# Patient Record
Sex: FEMALE | Race: BLACK OR AFRICAN AMERICAN | NOT HISPANIC OR LATINO | URBAN - METROPOLITAN AREA
[De-identification: names, ages, dates, MRNs, and addresses within clinical notes are randomized per-mention and may not be internally consistent; named-entity substitution may affect disease eponyms.]

---

## 2017-01-30 ENCOUNTER — OUTPATIENT (OUTPATIENT)
Dept: OUTPATIENT SERVICES | Facility: HOSPITAL | Age: 42
LOS: 1 days | End: 2017-01-30
Payer: COMMERCIAL

## 2017-01-30 VITALS
TEMPERATURE: 98 F | DIASTOLIC BLOOD PRESSURE: 102 MMHG | HEART RATE: 81 BPM | RESPIRATION RATE: 18 BRPM | SYSTOLIC BLOOD PRESSURE: 140 MMHG | WEIGHT: 235.89 LBS | HEIGHT: 65 IN

## 2017-01-30 DIAGNOSIS — R03.0 ELEVATED BLOOD-PRESSURE READING, WITHOUT DIAGNOSIS OF HYPERTENSION: ICD-10-CM

## 2017-01-30 DIAGNOSIS — Z91.013 ALLERGY TO SEAFOOD: ICD-10-CM

## 2017-01-30 DIAGNOSIS — Z90.721 ACQUIRED ABSENCE OF OVARIES, UNILATERAL: Chronic | ICD-10-CM

## 2017-01-30 DIAGNOSIS — S82.899A OTHER FRACTURE OF UNSPECIFIED LOWER LEG, INITIAL ENCOUNTER FOR CLOSED FRACTURE: Chronic | ICD-10-CM

## 2017-01-30 DIAGNOSIS — D25.9 LEIOMYOMA OF UTERUS, UNSPECIFIED: ICD-10-CM

## 2017-01-30 DIAGNOSIS — E66.9 OBESITY, UNSPECIFIED: ICD-10-CM

## 2017-01-30 LAB
BLD GP AB SCN SERPL QL: NEGATIVE — SIGNIFICANT CHANGE UP
BUN SERPL-MCNC: 12 MG/DL — SIGNIFICANT CHANGE UP (ref 7–23)
CALCIUM SERPL-MCNC: 9.2 MG/DL — SIGNIFICANT CHANGE UP (ref 8.4–10.5)
CHLORIDE SERPL-SCNC: 102 MMOL/L — SIGNIFICANT CHANGE UP (ref 98–107)
CO2 SERPL-SCNC: 27 MMOL/L — SIGNIFICANT CHANGE UP (ref 22–31)
CREAT SERPL-MCNC: 0.73 MG/DL — SIGNIFICANT CHANGE UP (ref 0.5–1.3)
GLUCOSE SERPL-MCNC: 113 MG/DL — HIGH (ref 70–99)
HCG SERPL-ACNC: < 5 MIU/ML — SIGNIFICANT CHANGE UP
HCT VFR BLD CALC: 39.8 % — SIGNIFICANT CHANGE UP (ref 34.5–45)
HGB BLD-MCNC: 12.4 G/DL — SIGNIFICANT CHANGE UP (ref 11.5–15.5)
MCHC RBC-ENTMCNC: 26.9 PG — LOW (ref 27–34)
MCHC RBC-ENTMCNC: 31.2 % — LOW (ref 32–36)
MCV RBC AUTO: 86.3 FL — SIGNIFICANT CHANGE UP (ref 80–100)
PLATELET # BLD AUTO: 259 K/UL — SIGNIFICANT CHANGE UP (ref 150–400)
PMV BLD: 11.3 FL — SIGNIFICANT CHANGE UP (ref 7–13)
POTASSIUM SERPL-MCNC: 4 MMOL/L — SIGNIFICANT CHANGE UP (ref 3.5–5.3)
POTASSIUM SERPL-SCNC: 4 MMOL/L — SIGNIFICANT CHANGE UP (ref 3.5–5.3)
RBC # BLD: 4.61 M/UL — SIGNIFICANT CHANGE UP (ref 3.8–5.2)
RBC # FLD: 20.5 % — HIGH (ref 10.3–14.5)
RH IG SCN BLD-IMP: POSITIVE — SIGNIFICANT CHANGE UP
SODIUM SERPL-SCNC: 142 MMOL/L — SIGNIFICANT CHANGE UP (ref 135–145)
WBC # BLD: 6.02 K/UL — SIGNIFICANT CHANGE UP (ref 3.8–10.5)
WBC # FLD AUTO: 6.02 K/UL — SIGNIFICANT CHANGE UP (ref 3.8–10.5)

## 2017-01-30 PROCEDURE — 93010 ELECTROCARDIOGRAM REPORT: CPT

## 2017-01-30 RX ORDER — SODIUM CHLORIDE 9 MG/ML
3 INJECTION INTRAMUSCULAR; INTRAVENOUS; SUBCUTANEOUS EVERY 8 HOURS
Qty: 0 | Refills: 0 | Status: DISCONTINUED | OUTPATIENT
Start: 2017-03-10 | End: 2017-03-23

## 2017-01-30 RX ORDER — SODIUM CHLORIDE 9 MG/ML
1000 INJECTION, SOLUTION INTRAVENOUS
Qty: 0 | Refills: 0 | Status: DISCONTINUED | OUTPATIENT
Start: 2017-03-10 | End: 2017-03-11

## 2017-01-30 NOTE — H&P PST ADULT - GASTROINTESTINAL DETAILS
soft/bowel sounds normal/nontender/no distention bowel sounds normal/soft/no guarding/nontender/no distention/no rigidity/no bruit/no rebound tenderness

## 2017-01-30 NOTE — H&P PST ADULT - NEGATIVE CARDIOVASCULAR SYMPTOMS
no paroxysmal nocturnal dyspnea/no palpitations/no dyspnea on exertion/no orthopnea/no chest pain/no peripheral edema no peripheral edema/no chest pain/no dyspnea on exertion/no paroxysmal nocturnal dyspnea/no palpitations/no claudication/no orthopnea

## 2017-01-30 NOTE — H&P PST ADULT - RS GEN PE MLT RESP DETAILS PC
airway patent/no subcutaneous emphysema/no rales/good air movement/respirations non-labored/no intercostal retractions/no chest wall tenderness/breath sounds equal/no wheezes/no rhonchi/clear to auscultation bilaterally

## 2017-01-30 NOTE — H&P PST ADULT - HISTORY OF PRESENT ILLNESS
43 y/o Black female presents to PST for pre op evaluation with hx of menorrhagia with clots x 3 . Pt stated that has been progressively worse for the past year. Pt was seen by GYN, had transvaginal and abd. US and MRI (2016). Pre op diagnosis leiomyoma of uterus. Now scheduled for abdominal myomectomy on 02/08/17 41 y/o Black female presents to PST for pre op evaluation with hx of menorrhagia with clots x 3 years. Pt stated that symptoms have been progressively worsening for the past year. Pt was seen by GYN, had transvaginal and abd. US and MRI (2016). Pre op diagnosis leiomyoma of uterus. Now scheduled for abdominal myomectomy on 02/08/17

## 2017-01-30 NOTE — H&P PST ADULT - NEGATIVE OPHTHALMOLOGIC SYMPTOMS
no loss of vision L/no scleral injection R/no pain R/no lacrimation L/no diplopia/no discharge R/no loss of vision R/no scleral injection L/no lacrimation R/no photophobia/no pain L/no irritation R/no discharge L/no irritation L/no blurred vision R/no blurred vision L

## 2017-01-30 NOTE — H&P PST ADULT - NEGATIVE GASTROINTESTINAL SYMPTOMS
no nausea/no vomiting/no diarrhea/no jaundice/no abdominal pain/no hiccoughs/no change in bowel habits/no melena

## 2017-01-30 NOTE — H&P PST ADULT - FAMILY HISTORY
Father  Still living? Yes, Estimated age: 61-70  Hypertension, Age at diagnosis: Age Unknown     Mother  Still living? Unknown  Hypertension, Age at diagnosis: Age Unknown

## 2017-01-30 NOTE — H&P PST ADULT - NEGATIVE GENERAL SYMPTOMS
no sweating/no weight loss/no fatigue/no weight gain/no malaise/no chills/no anorexia/no fever no chills/no weight loss/no polydipsia/no malaise/no fatigue/no anorexia/no fever/no polyuria/no weight gain/no sweating

## 2017-01-30 NOTE — H&P PST ADULT - PROBLEM SELECTOR PLAN 2
/100 repeated 140/102 during PST visit. pt denies any headache, change in vision , change in LOC or signs of cardiac decompensations. Pt instructed to see PCP for further BP evaluation prior to surgery. Pt left unit walking in no apparent distress.  Pending medical clearance

## 2017-03-08 ENCOUNTER — OUTPATIENT (OUTPATIENT)
Dept: OUTPATIENT SERVICES | Facility: HOSPITAL | Age: 42
LOS: 1 days | End: 2017-03-08
Payer: COMMERCIAL

## 2017-03-08 VITALS
SYSTOLIC BLOOD PRESSURE: 130 MMHG | RESPIRATION RATE: 16 BRPM | TEMPERATURE: 99 F | HEIGHT: 65.5 IN | HEART RATE: 84 BPM | DIASTOLIC BLOOD PRESSURE: 82 MMHG | WEIGHT: 229.94 LBS

## 2017-03-08 DIAGNOSIS — Z90.721 ACQUIRED ABSENCE OF OVARIES, UNILATERAL: Chronic | ICD-10-CM

## 2017-03-08 DIAGNOSIS — S82.899A OTHER FRACTURE OF UNSPECIFIED LOWER LEG, INITIAL ENCOUNTER FOR CLOSED FRACTURE: Chronic | ICD-10-CM

## 2017-03-08 DIAGNOSIS — I10 ESSENTIAL (PRIMARY) HYPERTENSION: ICD-10-CM

## 2017-03-08 DIAGNOSIS — D25.9 LEIOMYOMA OF UTERUS, UNSPECIFIED: ICD-10-CM

## 2017-03-08 LAB
BLD GP AB SCN SERPL QL: NEGATIVE — SIGNIFICANT CHANGE UP
BUN SERPL-MCNC: 14 MG/DL — SIGNIFICANT CHANGE UP (ref 7–23)
CALCIUM SERPL-MCNC: 9.5 MG/DL — SIGNIFICANT CHANGE UP (ref 8.4–10.5)
CHLORIDE SERPL-SCNC: 102 MMOL/L — SIGNIFICANT CHANGE UP (ref 98–107)
CO2 SERPL-SCNC: 25 MMOL/L — SIGNIFICANT CHANGE UP (ref 22–31)
CREAT SERPL-MCNC: 0.68 MG/DL — SIGNIFICANT CHANGE UP (ref 0.5–1.3)
GLUCOSE SERPL-MCNC: 102 MG/DL — HIGH (ref 70–99)
HCG SERPL-ACNC: < 5 MIU/ML — SIGNIFICANT CHANGE UP
HCT VFR BLD CALC: 39.2 % — SIGNIFICANT CHANGE UP (ref 34.5–45)
HGB BLD-MCNC: 12.7 G/DL — SIGNIFICANT CHANGE UP (ref 11.5–15.5)
MCHC RBC-ENTMCNC: 28.7 PG — SIGNIFICANT CHANGE UP (ref 27–34)
MCHC RBC-ENTMCNC: 32.4 % — SIGNIFICANT CHANGE UP (ref 32–36)
MCV RBC AUTO: 88.5 FL — SIGNIFICANT CHANGE UP (ref 80–100)
PLATELET # BLD AUTO: 280 K/UL — SIGNIFICANT CHANGE UP (ref 150–400)
PMV BLD: 11.2 FL — SIGNIFICANT CHANGE UP (ref 7–13)
POTASSIUM SERPL-MCNC: 3.8 MMOL/L — SIGNIFICANT CHANGE UP (ref 3.5–5.3)
POTASSIUM SERPL-SCNC: 3.8 MMOL/L — SIGNIFICANT CHANGE UP (ref 3.5–5.3)
RBC # BLD: 4.43 M/UL — SIGNIFICANT CHANGE UP (ref 3.8–5.2)
RBC # FLD: 15.1 % — HIGH (ref 10.3–14.5)
RH IG SCN BLD-IMP: POSITIVE — SIGNIFICANT CHANGE UP
SODIUM SERPL-SCNC: 142 MMOL/L — SIGNIFICANT CHANGE UP (ref 135–145)
WBC # BLD: 6.24 K/UL — SIGNIFICANT CHANGE UP (ref 3.8–10.5)
WBC # FLD AUTO: 6.24 K/UL — SIGNIFICANT CHANGE UP (ref 3.8–10.5)

## 2017-03-08 PROCEDURE — 93010 ELECTROCARDIOGRAM REPORT: CPT

## 2017-03-08 RX ORDER — SODIUM CHLORIDE 9 MG/ML
1000 INJECTION, SOLUTION INTRAVENOUS
Qty: 0 | Refills: 0 | Status: DISCONTINUED | OUTPATIENT
Start: 2017-03-10 | End: 2017-03-11

## 2017-03-08 RX ORDER — CHOLECALCIFEROL (VITAMIN D3) 125 MCG
1 CAPSULE ORAL
Qty: 0 | Refills: 0 | COMMUNITY

## 2017-03-08 NOTE — H&P PST ADULT - NEGATIVE GENERAL GENITOURINARY SYMPTOMS
no urinary hesitancy/no nocturia/no bladder infections/normal urinary frequency/no gas in urine/no dysuria/no flank pain L/normal libido/no flank pain R/no hematuria/no renal colic

## 2017-03-08 NOTE — H&P PST ADULT - PROBLEM SELECTOR PLAN 2
/100 repeated 140/102 during PST visit. pt denies any headache, change in vision , change in LOC or signs of cardiac decompensations. Pt instructed to see PCP for further BP evaluation prior to surgery. Pt left unit walking in no apparent distress.  Pending medical clearance BRIAN precautions recommended, OR booking notified via fax

## 2017-03-08 NOTE — H&P PST ADULT - NEGATIVE GASTROINTESTINAL SYMPTOMS
no abdominal pain/no vomiting/no nausea/no hiccoughs/no change in bowel habits/no diarrhea/no melena/no jaundice

## 2017-03-08 NOTE — H&P PST ADULT - NEGATIVE GENERAL SYMPTOMS
no chills/no malaise/no fatigue/no fever/no polydipsia/no weight loss/no weight gain/no polyuria/no anorexia/no sweating

## 2017-03-08 NOTE — H&P PST ADULT - PROBLEM SELECTOR PLAN 4
OR booking notified via fax pt instructed to take amlodipine on morning of surgery  pending copy of medical eval

## 2017-03-08 NOTE — H&P PST ADULT - HISTORY OF PRESENT ILLNESS
41 y/o Black female presents to PST for pre op evaluation with hx of menorrhagia with clots x 3 years. Pt stated that symptoms have been progressively worsening for the past year. Pt was seen by GYN, had transvaginal and abd. US and MRI (2016). Pre op diagnosis leiomyoma of uterus. Now scheduled for abdominal myomectomy on 02/08/17 41 y/o Black female presents to PST for pre op evaluation with hx of menorrhagia with clots x 3 years. Pt stated that symptoms have been progressively worsening for the past year. Pt was seen by GYN, had transvaginal and abd. US and MRI (2016). Pre op diagnosis leiomyoma of uterus. Was scheduled for abdominal myomectomy on 02/08/17.  Procedure was cancelled due to elevated b/p and now rescheduled for 3/10/2017.

## 2017-03-08 NOTE — H&P PST ADULT - NEGATIVE CARDIOVASCULAR SYMPTOMS
no claudication/no palpitations/no chest pain/no dyspnea on exertion/no orthopnea/no paroxysmal nocturnal dyspnea/no peripheral edema

## 2017-03-08 NOTE — H&P PST ADULT - PMH
Anemia    Leiomyoma of uterus, unspecified    Obesity (BMI 30-39.9) Anemia    Asthma  h/o childhood  Hypertension    Leiomyoma of uterus, unspecified    Obesity (BMI 30-39.9)

## 2017-03-08 NOTE — H&P PST ADULT - NEGATIVE OPHTHALMOLOGIC SYMPTOMS
no pain L/no photophobia/no loss of vision L/no irritation R/no pain R/no diplopia/no discharge L/no lacrimation L/no irritation L/no lacrimation R/no loss of vision R/no scleral injection R/no scleral injection L/no blurred vision R/no discharge R/no blurred vision L

## 2017-03-08 NOTE — H&P PST ADULT - PROBLEM SELECTOR PLAN 1
Scheduled for abdominal myomectomy on 02/08/17. Pre op instructions, famotidine chlorhexidine gluconate soap given and explained. Pt verbalized understanding. Pt instructed to bring urine specimen on day of surgery, container given to pt who verbalized understanding. Scheduled for abdominal myomectomy on 02/08/17. Pre op instructions, famotidine chlorhexidine gluconate soap given and explained.   Pt verbalized understanding of all PST instruction

## 2017-03-08 NOTE — H&P PST ADULT - NEGATIVE BREAST SYMPTOMS
no breast lump R/no nipple discharge L/no breast tenderness R/no breast lump L/no nipple discharge R

## 2017-03-09 NOTE — ASU PATIENT PROFILE, ADULT - PMH
Anemia    Asthma  h/o childhood  Hypertension    Leiomyoma of uterus, unspecified    Obesity (BMI 30-39.9)

## 2017-03-10 ENCOUNTER — INPATIENT (INPATIENT)
Facility: HOSPITAL | Age: 42
LOS: 2 days | Discharge: HOME | End: 2017-03-13
Attending: OBSTETRICS & GYNECOLOGY | Admitting: OBSTETRICS & GYNECOLOGY
Payer: COMMERCIAL

## 2017-03-10 VITALS
RESPIRATION RATE: 19 BRPM | TEMPERATURE: 99 F | SYSTOLIC BLOOD PRESSURE: 151 MMHG | HEART RATE: 80 BPM | DIASTOLIC BLOOD PRESSURE: 99 MMHG | OXYGEN SATURATION: 96 %

## 2017-03-10 VITALS
RESPIRATION RATE: 14 BRPM | TEMPERATURE: 98 F | WEIGHT: 229.94 LBS | SYSTOLIC BLOOD PRESSURE: 133 MMHG | DIASTOLIC BLOOD PRESSURE: 86 MMHG | OXYGEN SATURATION: 99 % | HEART RATE: 85 BPM | HEIGHT: 65.5 IN

## 2017-03-10 DIAGNOSIS — D25.9 LEIOMYOMA OF UTERUS, UNSPECIFIED: ICD-10-CM

## 2017-03-10 DIAGNOSIS — S82.899A OTHER FRACTURE OF UNSPECIFIED LOWER LEG, INITIAL ENCOUNTER FOR CLOSED FRACTURE: Chronic | ICD-10-CM

## 2017-03-10 DIAGNOSIS — Z90.721 ACQUIRED ABSENCE OF OVARIES, UNILATERAL: Chronic | ICD-10-CM

## 2017-03-10 LAB — HCG UR QL: NEGATIVE — SIGNIFICANT CHANGE UP

## 2017-03-10 PROCEDURE — 88305 TISSUE EXAM BY PATHOLOGIST: CPT | Mod: 26

## 2017-03-10 RX ORDER — HEPARIN SODIUM 5000 [USP'U]/ML
5000 INJECTION INTRAVENOUS; SUBCUTANEOUS EVERY 12 HOURS
Qty: 0 | Refills: 0 | Status: DISCONTINUED | OUTPATIENT
Start: 2017-03-10 | End: 2017-03-13

## 2017-03-10 RX ORDER — AMLODIPINE BESYLATE 2.5 MG/1
1 TABLET ORAL
Qty: 0 | Refills: 0 | COMMUNITY

## 2017-03-10 RX ORDER — SODIUM CHLORIDE 9 MG/ML
1000 INJECTION, SOLUTION INTRAVENOUS
Qty: 0 | Refills: 0 | Status: DISCONTINUED | OUTPATIENT
Start: 2017-03-10 | End: 2017-03-11

## 2017-03-10 RX ORDER — SIMETHICONE 80 MG/1
80 TABLET, CHEWABLE ORAL DAILY
Qty: 0 | Refills: 0 | Status: DISCONTINUED | OUTPATIENT
Start: 2017-03-10 | End: 2017-03-13

## 2017-03-10 RX ORDER — ONDANSETRON 8 MG/1
4 TABLET, FILM COATED ORAL ONCE
Qty: 0 | Refills: 0 | Status: COMPLETED | OUTPATIENT
Start: 2017-03-10 | End: 2017-03-10

## 2017-03-10 RX ORDER — AMLODIPINE BESYLATE 2.5 MG/1
5 TABLET ORAL DAILY
Qty: 0 | Refills: 0 | Status: DISCONTINUED | OUTPATIENT
Start: 2017-03-10 | End: 2017-03-13

## 2017-03-10 RX ORDER — NALOXONE HYDROCHLORIDE 4 MG/.1ML
0.1 SPRAY NASAL
Qty: 0 | Refills: 0 | Status: DISCONTINUED | OUTPATIENT
Start: 2017-03-10 | End: 2017-03-11

## 2017-03-10 RX ORDER — DOCUSATE SODIUM 100 MG
100 CAPSULE ORAL THREE TIMES A DAY
Qty: 0 | Refills: 0 | Status: DISCONTINUED | OUTPATIENT
Start: 2017-03-10 | End: 2017-03-13

## 2017-03-10 RX ORDER — FERROUS SULFATE 325(65) MG
1 TABLET ORAL
Qty: 0 | Refills: 0 | COMMUNITY

## 2017-03-10 RX ORDER — ESTROGENS, CONJUGATED 0.625 MG
2 TABLET ORAL DAILY
Qty: 0 | Refills: 0 | Status: DISCONTINUED | OUTPATIENT
Start: 2017-03-10 | End: 2017-03-11

## 2017-03-10 RX ORDER — LABETALOL HCL 100 MG
10 TABLET ORAL ONCE
Qty: 0 | Refills: 0 | Status: COMPLETED | OUTPATIENT
Start: 2017-03-10 | End: 2017-03-10

## 2017-03-10 RX ORDER — HYDROMORPHONE HYDROCHLORIDE 2 MG/ML
30 INJECTION INTRAMUSCULAR; INTRAVENOUS; SUBCUTANEOUS
Qty: 0 | Refills: 0 | Status: DISCONTINUED | OUTPATIENT
Start: 2017-03-10 | End: 2017-03-11

## 2017-03-10 RX ORDER — ONDANSETRON 8 MG/1
4 TABLET, FILM COATED ORAL EVERY 6 HOURS
Qty: 0 | Refills: 0 | Status: DISCONTINUED | OUTPATIENT
Start: 2017-03-10 | End: 2017-03-11

## 2017-03-10 RX ORDER — LABETALOL HCL 100 MG
20 TABLET ORAL ONCE
Qty: 0 | Refills: 0 | Status: COMPLETED | OUTPATIENT
Start: 2017-03-10 | End: 2017-03-10

## 2017-03-10 RX ORDER — HYDROMORPHONE HYDROCHLORIDE 2 MG/ML
0.5 INJECTION INTRAMUSCULAR; INTRAVENOUS; SUBCUTANEOUS EVERY 4 HOURS
Qty: 0 | Refills: 0 | Status: DISCONTINUED | OUTPATIENT
Start: 2017-03-10 | End: 2017-03-11

## 2017-03-10 RX ORDER — ESTROGENS, CONJUGATED 0.625 MG
0.62 TABLET ORAL DAILY
Qty: 0 | Refills: 0 | Status: DISCONTINUED | OUTPATIENT
Start: 2017-03-10 | End: 2017-03-10

## 2017-03-10 RX ORDER — HYDROMORPHONE HYDROCHLORIDE 2 MG/ML
0.5 INJECTION INTRAMUSCULAR; INTRAVENOUS; SUBCUTANEOUS
Qty: 0 | Refills: 0 | Status: DISCONTINUED | OUTPATIENT
Start: 2017-03-10 | End: 2017-03-13

## 2017-03-10 RX ORDER — IBUPROFEN 200 MG
2 TABLET ORAL
Qty: 0 | Refills: 0 | COMMUNITY

## 2017-03-10 RX ADMIN — ONDANSETRON 4 MILLIGRAM(S): 8 TABLET, FILM COATED ORAL at 20:03

## 2017-03-10 RX ADMIN — Medication 10 MILLIGRAM(S): at 19:19

## 2017-03-10 RX ADMIN — HEPARIN SODIUM 5000 UNIT(S): 5000 INJECTION INTRAVENOUS; SUBCUTANEOUS at 22:01

## 2017-03-10 RX ADMIN — Medication 100 MILLIGRAM(S): at 22:01

## 2017-03-10 RX ADMIN — SIMETHICONE 80 MILLIGRAM(S): 80 TABLET, CHEWABLE ORAL at 22:01

## 2017-03-10 RX ADMIN — HYDROMORPHONE HYDROCHLORIDE 30 MILLILITER(S): 2 INJECTION INTRAMUSCULAR; INTRAVENOUS; SUBCUTANEOUS at 21:14

## 2017-03-10 RX ADMIN — Medication 20 MILLIGRAM(S): at 21:13

## 2017-03-10 RX ADMIN — SODIUM CHLORIDE 125 MILLILITER(S): 9 INJECTION, SOLUTION INTRAVENOUS at 20:19

## 2017-03-10 RX ADMIN — SODIUM CHLORIDE 3 MILLILITER(S): 9 INJECTION INTRAMUSCULAR; INTRAVENOUS; SUBCUTANEOUS at 22:10

## 2017-03-10 NOTE — DISCHARGE NOTE ADULT - PLAN OF CARE
Recovery Regular diet as tolerated, regular activity as tolerated, no heavy lifting for first two weeks.  Take tylenol, motrin, and oxycodone as needed for pain control.  Do not drive while taking oxycodone.  Nothing per vagina (ie no tampons, douching, or intercourse) until cleared by your doctor.  Shower only, no baths.  Please make an appointment to see your doctor in 2 weeks.  Call your doctor or go to the ED if you have bleeding that does not stop, are unable to urinate, or have a fever >100.4.

## 2017-03-10 NOTE — DISCHARGE NOTE ADULT - INSTRUCTIONS
drink 9-13 eight oz. glasses of fluid daily. call md for sign of infection (temp greater than 101f, redness at incision, pain not relieved by meds). call md for follow up appt.

## 2017-03-10 NOTE — DISCHARGE NOTE ADULT - CONDITIONS AT DISCHARGE
lower transverse incision with steris intact and dry. pt ambulating, eating, voiding without difficulty. iv discontinued. no distress noted.

## 2017-03-10 NOTE — DISCHARGE NOTE ADULT - HOSPITAL COURSE
Patient was admitted for scheduled Abdominal Myomectomy.  Please see operative report for details.  The procedure was uncomplicated with an EBL of 200 and the patient tolerated it well. The patient was transferred to the floor in stable condition with PCA for pain control and kovacs in place.     POD#1 The patient was transitioned to PO pain medication, kovacs was removed, the patient voided and was tolerating a regular diet.      On the day of discharge the patient is afebrile and hemodynamically stable. She is ambulating without assistance, voiding spontaneously, and tolerating regular diet. Pain is well controlled on oral medication. She is cleared for discharge with instructions for appropriate follow up. Patient was admitted for scheduled Abdominal Myomectomy.  Please see operative report for details.  The procedure was uncomplicated with an EBL of 200 and the patient tolerated it well. The patient was transferred to the floor in stable condition with PCA for pain control and kovacs in place.     POD#1 The patient was transitioned to PO pain medication, kovacs was removed, the patient voided and was tolerating a regular diet.      On the day of discharge the patient is afebrile and hemodynamically stable. She is ambulating without assistance, voiding spontaneously, and tolerating regular diet. Pain is well controlled on oral medication. She is cleared for discharge with instructions for appropriate follow up.  She was discharged with an intrauterine pediatric kovacs in place.

## 2017-03-10 NOTE — DISCHARGE NOTE ADULT - CARE PROVIDER_API CALL
Radha Card), Obstetrics and Gynecology  [address]  [place] [address]  Phone: [phone]  Fax: [phone]

## 2017-03-10 NOTE — DISCHARGE NOTE ADULT - PATIENT PORTAL LINK FT
“You can access the Follow[facility] Patient Portal, offered by [facility], by registering with the following website: [email or web address]”

## 2017-03-10 NOTE — BRIEF OPERATIVE NOTE - OPERATION/FINDINGS
12wk sized uterus with 8 fibroids removed. Got into uterine cavity. Grossly normal L fallopian tube and ovary. No right ovary or fallopian tube

## 2017-03-10 NOTE — DISCHARGE NOTE ADULT - MEDICATION SUMMARY - MEDICATIONS TO TAKE
I will START or STAY ON the medications listed below when I get home from the hospital:    oxyCODONE 5 mg oral tablet  -- 1 tab(s) by mouth every 6 hours, As Needed -for severe pain MDD:4 tabs  -- Caution federal law prohibits the transfer of this drug to any person other  than the person for whom it was prescribed.  It is very important that you take or use this exactly as directed.  Do not skip doses or discontinue unless directed by your doctor.  May cause drowsiness.  Alcohol may intensify this effect.  Use care when operating dangerous machinery.  This prescription cannot be refilled.  Using more of this medication than prescribed may cause serious breathing problems.    -- Indication: For pain    acetaminophen 325 mg oral tablet  -- 3 tab(s) by mouth every 6 hours  -- Indication: For pain    Motrin  mg oral tablet  -- 2 tab(s) by mouth once a day, As Needed; last dose 2 weeks ago   -- Indication: For pain    doxycycline monohydrate 100 mg oral tablet  -- 1 tab(s) by mouth 2 times a day  -- Avoid prolonged or excessive exposure to direct and/or artificial sunlight while taking this medication.  Do not take this drug if you are pregnant.  Finish all this medication unless otherwise directed by prescriber.  Medication should be taken with plenty of water.    -- Indication: For Antibiotics    amLODIPine 5 mg oral tablet  -- 1 tab(s) by mouth once a day - am   -- Indication: For Hypertension    hydroCHLOROthiazide 12.5 mg oral capsule  -- 1 cap(s) by mouth once a day  -- Indication: For Hypertension    ferrous sulfate 325 mg (65 mg elemental iron) oral tablet  -- 1 tab(s) by mouth 3 times a day  -- Indication: For iron    Estrace 2 mg oral tablet  -- 1 tab(s) by mouth once a day  -- Do not take this drug if you are pregnant.  It is very important that you take or use this exactly as directed.  Do not skip doses or discontinue unless directed by your doctor.    -- Indication: For estrogen

## 2017-03-10 NOTE — DISCHARGE NOTE ADULT - CARE PLAN
Principal Discharge DX:	Leiomyoma of uterus, unspecified  Goal:	Recovery  Instructions for follow-up, activity and diet:	Regular diet as tolerated, regular activity as tolerated, no heavy lifting for first two weeks.  Take tylenol, motrin, and oxycodone as needed for pain control.  Do not drive while taking oxycodone.  Nothing per vagina (ie no tampons, douching, or intercourse) until cleared by your doctor.  Shower only, no baths.  Please make an appointment to see your doctor in 2 weeks.  Call your doctor or go to the ED if you have bleeding that does not stop, are unable to urinate, or have a fever >100.4.

## 2017-03-11 LAB
HCT VFR BLD CALC: 34.9 % — SIGNIFICANT CHANGE UP (ref 34.5–45)
HGB BLD-MCNC: 11.5 G/DL — SIGNIFICANT CHANGE UP (ref 11.5–15.5)
MCHC RBC-ENTMCNC: 29.3 PG — SIGNIFICANT CHANGE UP (ref 27–34)
MCHC RBC-ENTMCNC: 33 % — SIGNIFICANT CHANGE UP (ref 32–36)
MCV RBC AUTO: 88.8 FL — SIGNIFICANT CHANGE UP (ref 80–100)
PLATELET # BLD AUTO: 253 K/UL — SIGNIFICANT CHANGE UP (ref 150–400)
PMV BLD: 11.6 FL — SIGNIFICANT CHANGE UP (ref 7–13)
RBC # BLD: 3.93 M/UL — SIGNIFICANT CHANGE UP (ref 3.8–5.2)
RBC # FLD: 15.2 % — HIGH (ref 10.3–14.5)
WBC # BLD: 15.34 K/UL — HIGH (ref 3.8–10.5)
WBC # FLD AUTO: 15.34 K/UL — HIGH (ref 3.8–10.5)

## 2017-03-11 RX ORDER — OXYCODONE HYDROCHLORIDE 5 MG/1
5 TABLET ORAL
Qty: 0 | Refills: 0 | Status: DISCONTINUED | OUTPATIENT
Start: 2017-03-11 | End: 2017-03-13

## 2017-03-11 RX ORDER — ACETAMINOPHEN 500 MG
975 TABLET ORAL EVERY 6 HOURS
Qty: 0 | Refills: 0 | Status: DISCONTINUED | OUTPATIENT
Start: 2017-03-11 | End: 2017-03-13

## 2017-03-11 RX ORDER — OXYCODONE HYDROCHLORIDE 5 MG/1
5 TABLET ORAL EVERY 4 HOURS
Qty: 0 | Refills: 0 | Status: DISCONTINUED | OUTPATIENT
Start: 2017-03-11 | End: 2017-03-13

## 2017-03-11 RX ORDER — IBUPROFEN 200 MG
600 TABLET ORAL EVERY 6 HOURS
Qty: 0 | Refills: 0 | Status: DISCONTINUED | OUTPATIENT
Start: 2017-03-11 | End: 2017-03-13

## 2017-03-11 RX ADMIN — HEPARIN SODIUM 5000 UNIT(S): 5000 INJECTION INTRAVENOUS; SUBCUTANEOUS at 05:43

## 2017-03-11 RX ADMIN — OXYCODONE HYDROCHLORIDE 5 MILLIGRAM(S): 5 TABLET ORAL at 23:35

## 2017-03-11 RX ADMIN — SODIUM CHLORIDE 3 MILLILITER(S): 9 INJECTION INTRAMUSCULAR; INTRAVENOUS; SUBCUTANEOUS at 13:57

## 2017-03-11 RX ADMIN — Medication 600 MILLIGRAM(S): at 23:05

## 2017-03-11 RX ADMIN — Medication 600 MILLIGRAM(S): at 23:35

## 2017-03-11 RX ADMIN — SODIUM CHLORIDE 125 MILLILITER(S): 9 INJECTION, SOLUTION INTRAVENOUS at 01:11

## 2017-03-11 RX ADMIN — Medication 975 MILLIGRAM(S): at 12:56

## 2017-03-11 RX ADMIN — Medication 975 MILLIGRAM(S): at 13:30

## 2017-03-11 RX ADMIN — Medication 600 MILLIGRAM(S): at 18:01

## 2017-03-11 RX ADMIN — OXYCODONE HYDROCHLORIDE 5 MILLIGRAM(S): 5 TABLET ORAL at 18:01

## 2017-03-11 RX ADMIN — ONDANSETRON 4 MILLIGRAM(S): 8 TABLET, FILM COATED ORAL at 02:01

## 2017-03-11 RX ADMIN — Medication 600 MILLIGRAM(S): at 17:30

## 2017-03-11 RX ADMIN — HYDROMORPHONE HYDROCHLORIDE 30 MILLILITER(S): 2 INJECTION INTRAMUSCULAR; INTRAVENOUS; SUBCUTANEOUS at 01:11

## 2017-03-11 RX ADMIN — Medication 100 MILLIGRAM(S): at 05:43

## 2017-03-11 RX ADMIN — Medication 975 MILLIGRAM(S): at 17:30

## 2017-03-11 RX ADMIN — HEPARIN SODIUM 5000 UNIT(S): 5000 INJECTION INTRAVENOUS; SUBCUTANEOUS at 17:30

## 2017-03-11 RX ADMIN — HYDROMORPHONE HYDROCHLORIDE 30 MILLILITER(S): 2 INJECTION INTRAMUSCULAR; INTRAVENOUS; SUBCUTANEOUS at 07:22

## 2017-03-11 RX ADMIN — SODIUM CHLORIDE 3 MILLILITER(S): 9 INJECTION INTRAMUSCULAR; INTRAVENOUS; SUBCUTANEOUS at 23:04

## 2017-03-11 RX ADMIN — SODIUM CHLORIDE 3 MILLILITER(S): 9 INJECTION INTRAMUSCULAR; INTRAVENOUS; SUBCUTANEOUS at 05:40

## 2017-03-11 RX ADMIN — OXYCODONE HYDROCHLORIDE 5 MILLIGRAM(S): 5 TABLET ORAL at 23:05

## 2017-03-11 RX ADMIN — Medication 975 MILLIGRAM(S): at 18:00

## 2017-03-11 RX ADMIN — SIMETHICONE 80 MILLIGRAM(S): 80 TABLET, CHEWABLE ORAL at 12:15

## 2017-03-11 RX ADMIN — OXYCODONE HYDROCHLORIDE 5 MILLIGRAM(S): 5 TABLET ORAL at 17:30

## 2017-03-11 RX ADMIN — Medication 975 MILLIGRAM(S): at 23:05

## 2017-03-11 RX ADMIN — Medication 975 MILLIGRAM(S): at 23:35

## 2017-03-11 RX ADMIN — AMLODIPINE BESYLATE 5 MILLIGRAM(S): 2.5 TABLET ORAL at 05:43

## 2017-03-11 RX ADMIN — SODIUM CHLORIDE 125 MILLILITER(S): 9 INJECTION, SOLUTION INTRAVENOUS at 07:22

## 2017-03-11 RX ADMIN — Medication 100 MILLIGRAM(S): at 17:30

## 2017-03-12 LAB
HCT VFR BLD CALC: 32.3 % — LOW (ref 34.5–45)
HGB BLD-MCNC: 10.7 G/DL — LOW (ref 11.5–15.5)
MCHC RBC-ENTMCNC: 29.9 PG — SIGNIFICANT CHANGE UP (ref 27–34)
MCHC RBC-ENTMCNC: 33.1 % — SIGNIFICANT CHANGE UP (ref 32–36)
MCV RBC AUTO: 90.2 FL — SIGNIFICANT CHANGE UP (ref 80–100)
PLATELET # BLD AUTO: 201 K/UL — SIGNIFICANT CHANGE UP (ref 150–400)
PMV BLD: 10.7 FL — SIGNIFICANT CHANGE UP (ref 7–13)
RBC # BLD: 3.58 M/UL — LOW (ref 3.8–5.2)
RBC # FLD: 15.5 % — HIGH (ref 10.3–14.5)
WBC # BLD: 12.09 K/UL — HIGH (ref 3.8–10.5)
WBC # FLD AUTO: 12.09 K/UL — HIGH (ref 3.8–10.5)

## 2017-03-12 RX ADMIN — Medication 100 MILLIGRAM(S): at 18:42

## 2017-03-12 RX ADMIN — HEPARIN SODIUM 5000 UNIT(S): 5000 INJECTION INTRAVENOUS; SUBCUTANEOUS at 18:41

## 2017-03-12 RX ADMIN — OXYCODONE HYDROCHLORIDE 5 MILLIGRAM(S): 5 TABLET ORAL at 10:45

## 2017-03-12 RX ADMIN — Medication 100 MILLIGRAM(S): at 09:48

## 2017-03-12 RX ADMIN — Medication 975 MILLIGRAM(S): at 06:51

## 2017-03-12 RX ADMIN — Medication 600 MILLIGRAM(S): at 19:10

## 2017-03-12 RX ADMIN — OXYCODONE HYDROCHLORIDE 5 MILLIGRAM(S): 5 TABLET ORAL at 16:13

## 2017-03-12 RX ADMIN — Medication 975 MILLIGRAM(S): at 12:35

## 2017-03-12 RX ADMIN — Medication 975 MILLIGRAM(S): at 18:39

## 2017-03-12 RX ADMIN — OXYCODONE HYDROCHLORIDE 5 MILLIGRAM(S): 5 TABLET ORAL at 13:46

## 2017-03-12 RX ADMIN — Medication 600 MILLIGRAM(S): at 12:36

## 2017-03-12 RX ADMIN — OXYCODONE HYDROCHLORIDE 5 MILLIGRAM(S): 5 TABLET ORAL at 09:48

## 2017-03-12 RX ADMIN — Medication 100 MILLIGRAM(S): at 06:22

## 2017-03-12 RX ADMIN — SODIUM CHLORIDE 3 MILLILITER(S): 9 INJECTION INTRAMUSCULAR; INTRAVENOUS; SUBCUTANEOUS at 22:14

## 2017-03-12 RX ADMIN — OXYCODONE HYDROCHLORIDE 5 MILLIGRAM(S): 5 TABLET ORAL at 18:41

## 2017-03-12 RX ADMIN — SIMETHICONE 80 MILLIGRAM(S): 80 TABLET, CHEWABLE ORAL at 12:34

## 2017-03-12 RX ADMIN — HEPARIN SODIUM 5000 UNIT(S): 5000 INJECTION INTRAVENOUS; SUBCUTANEOUS at 06:21

## 2017-03-12 RX ADMIN — OXYCODONE HYDROCHLORIDE 5 MILLIGRAM(S): 5 TABLET ORAL at 17:00

## 2017-03-12 RX ADMIN — Medication 975 MILLIGRAM(S): at 06:21

## 2017-03-12 RX ADMIN — OXYCODONE HYDROCHLORIDE 5 MILLIGRAM(S): 5 TABLET ORAL at 12:43

## 2017-03-12 RX ADMIN — Medication 975 MILLIGRAM(S): at 13:30

## 2017-03-12 RX ADMIN — AMLODIPINE BESYLATE 5 MILLIGRAM(S): 2.5 TABLET ORAL at 06:22

## 2017-03-12 RX ADMIN — Medication 600 MILLIGRAM(S): at 18:41

## 2017-03-12 RX ADMIN — Medication 600 MILLIGRAM(S): at 06:22

## 2017-03-12 RX ADMIN — Medication 975 MILLIGRAM(S): at 19:10

## 2017-03-12 RX ADMIN — Medication 600 MILLIGRAM(S): at 13:30

## 2017-03-12 RX ADMIN — SODIUM CHLORIDE 3 MILLILITER(S): 9 INJECTION INTRAMUSCULAR; INTRAVENOUS; SUBCUTANEOUS at 14:00

## 2017-03-12 RX ADMIN — SODIUM CHLORIDE 3 MILLILITER(S): 9 INJECTION INTRAMUSCULAR; INTRAVENOUS; SUBCUTANEOUS at 07:15

## 2017-03-12 RX ADMIN — OXYCODONE HYDROCHLORIDE 5 MILLIGRAM(S): 5 TABLET ORAL at 13:30

## 2017-03-12 RX ADMIN — OXYCODONE HYDROCHLORIDE 5 MILLIGRAM(S): 5 TABLET ORAL at 14:44

## 2017-03-12 RX ADMIN — OXYCODONE HYDROCHLORIDE 5 MILLIGRAM(S): 5 TABLET ORAL at 19:10

## 2017-03-12 RX ADMIN — Medication 600 MILLIGRAM(S): at 06:51

## 2017-03-13 VITALS
SYSTOLIC BLOOD PRESSURE: 115 MMHG | DIASTOLIC BLOOD PRESSURE: 60 MMHG | RESPIRATION RATE: 16 BRPM | OXYGEN SATURATION: 100 % | HEART RATE: 87 BPM | TEMPERATURE: 99 F

## 2017-03-13 RX ORDER — OXYCODONE HYDROCHLORIDE 5 MG/1
1 TABLET ORAL
Qty: 20 | Refills: 0 | OUTPATIENT
Start: 2017-03-13

## 2017-03-13 RX ORDER — ACETAMINOPHEN 500 MG
3 TABLET ORAL
Qty: 0 | Refills: 0 | COMMUNITY
Start: 2017-03-13

## 2017-03-13 RX ADMIN — OXYCODONE HYDROCHLORIDE 5 MILLIGRAM(S): 5 TABLET ORAL at 06:30

## 2017-03-13 RX ADMIN — Medication 975 MILLIGRAM(S): at 05:57

## 2017-03-13 RX ADMIN — Medication 600 MILLIGRAM(S): at 06:30

## 2017-03-13 RX ADMIN — AMLODIPINE BESYLATE 5 MILLIGRAM(S): 2.5 TABLET ORAL at 05:57

## 2017-03-13 RX ADMIN — OXYCODONE HYDROCHLORIDE 5 MILLIGRAM(S): 5 TABLET ORAL at 10:30

## 2017-03-13 RX ADMIN — OXYCODONE HYDROCHLORIDE 5 MILLIGRAM(S): 5 TABLET ORAL at 09:35

## 2017-03-13 RX ADMIN — Medication 975 MILLIGRAM(S): at 06:30

## 2017-03-13 RX ADMIN — Medication 100 MILLIGRAM(S): at 05:57

## 2017-03-13 RX ADMIN — SIMETHICONE 80 MILLIGRAM(S): 80 TABLET, CHEWABLE ORAL at 11:38

## 2017-03-13 RX ADMIN — SODIUM CHLORIDE 3 MILLILITER(S): 9 INJECTION INTRAMUSCULAR; INTRAVENOUS; SUBCUTANEOUS at 06:30

## 2017-03-13 RX ADMIN — HEPARIN SODIUM 5000 UNIT(S): 5000 INJECTION INTRAVENOUS; SUBCUTANEOUS at 05:57

## 2017-03-13 RX ADMIN — OXYCODONE HYDROCHLORIDE 5 MILLIGRAM(S): 5 TABLET ORAL at 05:57

## 2017-03-13 RX ADMIN — Medication 975 MILLIGRAM(S): at 11:37

## 2017-03-13 RX ADMIN — Medication 600 MILLIGRAM(S): at 05:57

## 2017-03-13 RX ADMIN — Medication 600 MILLIGRAM(S): at 11:38

## 2017-03-16 LAB — SURGICAL PATHOLOGY STUDY: SIGNIFICANT CHANGE UP

## 2019-12-30 NOTE — H&P PST ADULT - VENOUS THROMBOEMBOLISM FOR WOMEN ONLY
Detail Level: Detailed Quality 226: Preventive Care And Screening: Tobacco Use: Screening And Cessation Intervention: Patient screened for tobacco use and is an ex/non-smoker Quality 431: Preventive Care And Screening: Unhealthy Alcohol Use - Screening: Patient screened for unhealthy alcohol use using a single question and scores less than 2 times per year none

## 2020-01-10 NOTE — H&P PST ADULT - LAST CARDIAC ANGIOGRAM/IMAGING
denies I have personally performed a face to face diagnostic evaluation on this patient. I have reviewed the ACP note and agree with the history, exam and plan of care, except as noted.

## 2021-08-22 NOTE — ASU PATIENT PROFILE, ADULT - FALL HARM RISK TYPE OF ASSESSMENT
Pt nicked leg around knee while shaving, took outer layer of skin off  Pt states two more bumps appeared since  Some drainage coming out, cleaned several times today       Tamar Ramon RN  08/22/21 1625
Admission

## 2022-01-06 NOTE — H&P PST ADULT - NEGATIVE GENERAL GENITOURINARY SYMPTOMS
Yes no renal colic/no flank pain L/no gas in urine/no urinary hesitancy/no bladder infections/normal urinary frequency/no hematuria/no flank pain R no nocturia/no gas in urine/no hematuria/no flank pain R/normal urinary frequency/no renal colic/no dysuria/normal libido/no urinary hesitancy/no flank pain L/no bladder infections

## 2022-02-16 NOTE — H&P PST ADULT - NEGATIVE BREAST SYMPTOMS
none
no breast lump R/no breast lump L/no nipple discharge R/no nipple discharge L/no breast tenderness R

## 2023-01-04 NOTE — H&P PST ADULT - SKIN
detailed exam color normal/warm and dry Full Thickness Lip Wedge Repair (Flap) Text: Given the location of the defect and the proximity to free margins a full thickness wedge repair was deemed most appropriate.  Using a sterile surgical marker, the appropriate repair was drawn incorporating the defect and placing the expected incisions perpendicular to the vermilion border.  The vermilion border was also meticulously outlined to ensure appropriate reapproximation during the repair.  The area thus outlined was incised through and through with a #15 scalpel blade.  The muscularis and dermis were reaproximated with deep sutures following hemostasis. Care was taken to realign the vermilion border before proceeding with the superficial closure.  Once the vermilion was realigned the superfical and mucosal closure was finished.

## 2024-12-18 NOTE — H&P PST ADULT - FUNCTIONAL LEVEL PRIOR: EATING
Writer s pool.  
(0) independent

## 2025-05-24 NOTE — H&P PST ADULT - TEACHING/LEARNING FACTORS IMPACT ABILITY TO LEARN
[facility] [phone]    [facility] VIDEO VISIT PROGRESS NOTE    CHIEF COMPLAINT  Chief Complaint   Patient presents with   • Video Visit   • URI     Painful cough with rattle in chest       SUBJECTIVE  HISTORY OF PRESENT ILLNESS:   Vandana Amador is a 39 year old female who consents to a two-way Video Visit (V-Visit) evaluation for upper respiratory illness concerns.    Vandana reports the following symptoms: congestion, runny nose, PND, sore throat, headaches, body aches, wheezing, dry cough, chest pain with coughing only,  Symptom onset: 4 days.    The patient has not been tested for COVID-19 for this illness. Children were sick prior with similar symptoms.  The patient has tried Theraflu, Tylenol, and ibuprofen for their current symptoms, which has been somewhat effective. No history of asthma.    Denies chest pain, shortness of breath, palpitations, uncontrolled fever, inability to tolerate fluids, abdominal pain.    ALLERGIES  ALLERGIES:   Allergen Reactions   • Sumatriptan PRURITUS and Other (See Comments)     MAKES HEADACHE WORSE,nausea  Chills, Headache  Chills, Headache     • Amoxicillin SWELLING   • Azithromycin SWELLING     Swelling and itching of eyes    • Eletriptan Other (See Comments)     States she feels like she has the flu when taking this medication.   • Kiwi Other (See Comments)        MEDICATIONS  Current Outpatient Medications   Medication Sig   • albuterol 108 (90 Base) MCG/ACT inhaler Inhale 2 puffs into the lungs every 4 hours as needed for Shortness of Breath or Wheezing.   • benzonatate (TESSALON PERLES) 200 MG capsule Take 1 capsule by mouth 3 times daily as needed for Cough.   • amphetamine-dextroamphetamine XR (Adderall XR) 15 MG 24 hr capsule Take 1 capsule by mouth daily.   • ALPRAZolam (Xanax) 0.5 MG tablet Take 1 tablet by mouth daily as needed for Sleep.   • lamoTRIgine (LaMICtal) 25 MG tablet Take 2 tablets by mouth daily.   • Vortioxetine HBr  (Trintellix) 20 MG Tab Take 1 tablet by mouth daily.   • Brexpiprazole (Rexulti) 1 MG Tab Take 1 tablet by mouth daily.   • enalapril (VASOTEC) 10 MG tablet Take 1 tablet by mouth daily.   • ibuprofen (MOTRIN) 800 MG tablet Take 1 tablet by mouth every 8 hours as needed for Pain.   • ferrous sulfate 324 (65 Fe) MG EC tablet Take 1 tablet by mouth daily (with breakfast).   • folic acid (FOLATE) 1 MG tablet Take 1 tablet by mouth daily.     No current facility-administered medications for this visit.        OBJECTIVE    Information acquired with patient assistance, demonstration, and feedback due to two-way video visit method of visit. Portions of assessment may be difficult to visualize precisely given nature of technology and limitations of assessment with virtual platform.     There were no vitals filed for this visit.    GENERAL: Awake, alert, oriented and in no acute distress.  SKIN: Appears pink and dry.   HENT: Normocephalic, atraumatic. Pupils are equal, round. Conjunctivae are not injected.   Mouth/Throat: Lips appear moist. no trismus, no \"hot potato voice\", and no drooling or inability to control secretions .     RESPIRATORY:  Breathing effort is normal. Able to speak in full sentences. No evidence of respiratory distress.  PSYCHIATRIC: The patient was able to demonstrate good judgement and reason without abnormal affect or abnormal behaviors during the examination.     ASSESSMENT/PLAN   Viral URI with cough (Primary)  - albuterol 108 (90 Base) MCG/ACT inhaler; Inhale 2 puffs into the lungs every 4 hours as needed for Shortness of Breath or Wheezing.  Dispense: 1 each; Refill: 0  - benzonatate (TESSALON PERLES) 200 MG capsule; Take 1 capsule by mouth 3 times daily as needed for Cough.  Dispense: 21 capsule; Refill: 0    After evaluating the patient, the presentation seems to be suggestive of an upper respiratory illness, including but not limited to the common cold, viral sinusitis, strep throat, influenza,  or COVID-19.   Symptomatic management recommended and included in patient instructions.     FOLLOW-UP   Return for As needed .  If symptoms are no better after 10 days, they can return to [facility] for a follow up visit and discussion for further treatment. If symptoms worsen, they should be seen in Urgent Care, Immediate Care, or the Emergency Department.      PATIENT INSTRUCTIONS  Attached in After Visit Summary  The patient verbalizes understanding of the diagnosis and plan of care. There were no further questions or concerns.   They were advised to contact the [employer] [facility] RN with any questions at [phone].    CONSENT:  This visit is being performed virtually via This visit is being performed virtually via Video visit using [employer] [facility].     [facility]  Patient is located at home in the [facility]    Vandana Campos NP  5/24/2025  3:20 PM     none